# Patient Record
Sex: FEMALE | Race: WHITE
[De-identification: names, ages, dates, MRNs, and addresses within clinical notes are randomized per-mention and may not be internally consistent; named-entity substitution may affect disease eponyms.]

---

## 2019-09-24 ENCOUNTER — HOSPITAL ENCOUNTER (EMERGENCY)
Dept: HOSPITAL 12 - ER | Age: 69
LOS: 1 days | Discharge: ANOTHER HOSPITAL | End: 2019-09-25
Payer: COMMERCIAL

## 2019-09-24 VITALS — BODY MASS INDEX: 22.2 KG/M2 | HEIGHT: 64 IN | WEIGHT: 130 LBS

## 2019-09-24 DIAGNOSIS — E03.9: ICD-10-CM

## 2019-09-24 DIAGNOSIS — M25.561: Primary | ICD-10-CM

## 2019-09-24 DIAGNOSIS — E78.5: ICD-10-CM

## 2019-09-24 DIAGNOSIS — F32.9: ICD-10-CM

## 2019-09-24 LAB
ALP SERPL-CCNC: 76 U/L (ref 50–136)
ALT SERPL W/O P-5'-P-CCNC: 15 U/L (ref 14–59)
AST SERPL-CCNC: 17 U/L (ref 15–37)
BASOPHILS # BLD AUTO: 0 K/UL (ref 0–8)
BASOPHILS NFR BLD AUTO: 0.2 % (ref 0–2)
BILIRUB DIRECT SERPL-MCNC: 0.1 MG/DL (ref 0–0.2)
BILIRUB SERPL-MCNC: 0.2 MG/DL (ref 0.2–1)
BUN SERPL-MCNC: 16 MG/DL (ref 7–18)
CHLORIDE SERPL-SCNC: 108 MMOL/L (ref 98–107)
CO2 SERPL-SCNC: 29 MMOL/L (ref 21–32)
CREAT SERPL-MCNC: 0.8 MG/DL (ref 0.6–1.3)
EOSINOPHIL # BLD AUTO: 0.1 K/UL (ref 0–0.7)
EOSINOPHIL NFR BLD AUTO: 1.1 % (ref 0–7)
GLUCOSE SERPL-MCNC: 96 MG/DL (ref 74–106)
HCT VFR BLD AUTO: 35.7 % (ref 31.2–41.9)
HGB BLD-MCNC: 11.9 G/DL (ref 10.9–14.3)
LYMPHOCYTES # BLD AUTO: 1.8 K/UL (ref 20–40)
LYMPHOCYTES NFR BLD AUTO: 30.7 % (ref 20.5–51.5)
MCH RBC QN AUTO: 30.6 UUG (ref 24.7–32.8)
MCHC RBC AUTO-ENTMCNC: 33 G/DL (ref 32.3–35.6)
MCV RBC AUTO: 92.3 FL (ref 75.5–95.3)
MONOCYTES # BLD AUTO: 0.5 K/UL (ref 2–10)
MONOCYTES NFR BLD AUTO: 9 % (ref 0–11)
NEUTROPHILS # BLD AUTO: 3.5 K/UL (ref 1.8–8.9)
NEUTROPHILS NFR BLD AUTO: 59 % (ref 38.5–71.5)
PLATELET # BLD AUTO: 249 K/UL (ref 179–408)
POTASSIUM SERPL-SCNC: 4.5 MMOL/L (ref 3.5–5.1)
RBC # BLD AUTO: 3.87 MIL/UL (ref 3.63–4.92)
WBC # BLD AUTO: 6 K/UL (ref 3.8–11.8)
WS STN SPEC: 6.9 G/DL (ref 6.4–8.2)

## 2019-09-24 PROCEDURE — 96372 THER/PROPH/DIAG INJ SC/IM: CPT

## 2019-09-24 PROCEDURE — A4663 DIALYSIS BLOOD PRESSURE CUFF: HCPCS

## 2019-09-24 PROCEDURE — 80048 BASIC METABOLIC PNL TOTAL CA: CPT

## 2019-09-24 PROCEDURE — 93971 EXTREMITY STUDY: CPT

## 2019-09-24 PROCEDURE — 85025 COMPLETE CBC W/AUTO DIFF WBC: CPT

## 2019-09-24 PROCEDURE — 85730 THROMBOPLASTIN TIME PARTIAL: CPT

## 2019-09-24 PROCEDURE — 73562 X-RAY EXAM OF KNEE 3: CPT

## 2019-09-24 PROCEDURE — 36415 COLL VENOUS BLD VENIPUNCTURE: CPT

## 2019-09-24 PROCEDURE — 99285 EMERGENCY DEPT VISIT HI MDM: CPT

## 2019-09-24 PROCEDURE — 80076 HEPATIC FUNCTION PANEL: CPT

## 2019-09-24 NOTE — NUR
[facility] called Back Patient will be going to [facility] ER. Admitting MD is Dr Mota.

## 2019-09-25 ENCOUNTER — HOSPITAL ENCOUNTER (OUTPATIENT)
Dept: HOSPITAL 91 - E/R | Age: 69
Setting detail: OBSERVATION
LOS: 2 days | Discharge: HOME HEALTH | End: 2019-09-27
Payer: COMMERCIAL

## 2019-09-25 ENCOUNTER — HOSPITAL ENCOUNTER (OUTPATIENT)
Dept: HOSPITAL 10 - E/R | Age: 69
Setting detail: OBSERVATION
LOS: 2 days | Discharge: HOME HEALTH | End: 2019-09-27
Admitting: INTERNAL MEDICINE
Payer: COMMERCIAL

## 2019-09-25 VITALS — SYSTOLIC BLOOD PRESSURE: 107 MMHG | RESPIRATION RATE: 18 BRPM | HEART RATE: 56 BPM | DIASTOLIC BLOOD PRESSURE: 51 MMHG

## 2019-09-25 VITALS
BODY MASS INDEX: 20.75 KG/M2 | WEIGHT: 124.56 LBS | BODY MASS INDEX: 20.75 KG/M2 | HEIGHT: 65 IN | HEIGHT: 65 IN | WEIGHT: 124.56 LBS

## 2019-09-25 VITALS — DIASTOLIC BLOOD PRESSURE: 55 MMHG | RESPIRATION RATE: 18 BRPM | HEART RATE: 69 BPM | SYSTOLIC BLOOD PRESSURE: 101 MMHG

## 2019-09-25 VITALS — SYSTOLIC BLOOD PRESSURE: 131 MMHG | DIASTOLIC BLOOD PRESSURE: 60 MMHG | RESPIRATION RATE: 18 BRPM | HEART RATE: 62 BPM

## 2019-09-25 VITALS — HEART RATE: 76 BPM | SYSTOLIC BLOOD PRESSURE: 106 MMHG | DIASTOLIC BLOOD PRESSURE: 54 MMHG | RESPIRATION RATE: 18 BRPM

## 2019-09-25 DIAGNOSIS — M17.11: Primary | ICD-10-CM

## 2019-09-25 DIAGNOSIS — E03.9: ICD-10-CM

## 2019-09-25 DIAGNOSIS — F41.9: ICD-10-CM

## 2019-09-25 DIAGNOSIS — E78.5: ICD-10-CM

## 2019-09-25 PROCEDURE — 97164 PT RE-EVAL EST PLAN CARE: CPT

## 2019-09-25 PROCEDURE — 80053 COMPREHEN METABOLIC PANEL: CPT

## 2019-09-25 PROCEDURE — 84443 ASSAY THYROID STIM HORMONE: CPT

## 2019-09-25 PROCEDURE — 97110 THERAPEUTIC EXERCISES: CPT

## 2019-09-25 PROCEDURE — 99285 EMERGENCY DEPT VISIT HI MDM: CPT

## 2019-09-25 PROCEDURE — 97161 PT EVAL LOW COMPLEX 20 MIN: CPT

## 2019-09-25 PROCEDURE — 97116 GAIT TRAINING THERAPY: CPT

## 2019-09-25 PROCEDURE — 96372 THER/PROPH/DIAG INJ SC/IM: CPT

## 2019-09-25 PROCEDURE — 84100 ASSAY OF PHOSPHORUS: CPT

## 2019-09-25 PROCEDURE — 85025 COMPLETE CBC W/AUTO DIFF WBC: CPT

## 2019-09-25 PROCEDURE — 73721 MRI JNT OF LWR EXTRE W/O DYE: CPT

## 2019-09-25 PROCEDURE — 83735 ASSAY OF MAGNESIUM: CPT

## 2019-09-25 PROCEDURE — 80061 LIPID PANEL: CPT

## 2019-09-25 PROCEDURE — 83036 HEMOGLOBIN GLYCOSYLATED A1C: CPT

## 2019-09-25 PROCEDURE — 96375 TX/PRO/DX INJ NEW DRUG ADDON: CPT

## 2019-09-25 PROCEDURE — G0378 HOSPITAL OBSERVATION PER HR: HCPCS

## 2019-09-25 RX ADMIN — ENOXAPARIN SODIUM SCH MG: 100 INJECTION SUBCUTANEOUS at 09:05

## 2019-09-25 RX ADMIN — IBUPROFEN 1 MG: 400 TABLET, FILM COATED ORAL at 14:16

## 2019-09-25 RX ADMIN — IBUPROFEN SCH MG: 400 TABLET ORAL at 18:42

## 2019-09-25 RX ADMIN — IBUPROFEN 1 MG: 400 TABLET, FILM COATED ORAL at 23:59

## 2019-09-25 RX ADMIN — ACETAMINOPHEN 1 MG: 325 TABLET, FILM COATED ORAL at 21:41

## 2019-09-25 RX ADMIN — ATORVASTATIN CALCIUM 1 MG: 10 TABLET, FILM COATED ORAL at 21:41

## 2019-09-25 RX ADMIN — IBUPROFEN SCH MG: 400 TABLET ORAL at 23:59

## 2019-09-25 RX ADMIN — CITALOPRAM HYDROBROMIDE 1 MG: 20 TABLET ORAL at 21:42

## 2019-09-25 RX ADMIN — CITALOPRAM HYDROBROMIDE SCH MG: 20 TABLET ORAL at 21:42

## 2019-09-25 RX ADMIN — IBUPROFEN 1 MG: 400 TABLET, FILM COATED ORAL at 18:42

## 2019-09-25 RX ADMIN — ATORVASTATIN CALCIUM SCH MG: 10 TABLET, FILM COATED ORAL at 21:41

## 2019-09-25 RX ADMIN — IBUPROFEN SCH MG: 400 TABLET ORAL at 09:04

## 2019-09-25 RX ADMIN — DEXAMETHASONE SODIUM PHOSPHATE 1 MG: 4 INJECTION, SOLUTION INTRAMUSCULAR; INTRAVENOUS at 06:09

## 2019-09-25 RX ADMIN — ENOXAPARIN SODIUM 1 MG: 100 INJECTION SUBCUTANEOUS at 09:05

## 2019-09-25 RX ADMIN — IBUPROFEN SCH MG: 400 TABLET ORAL at 14:16

## 2019-09-25 RX ADMIN — IBUPROFEN 1 MG: 400 TABLET, FILM COATED ORAL at 09:04

## 2019-09-25 NOTE — NUR
patient picked up from [facility] to be transferred to [facility] . 
patient ins table condition,. all belongings taken with the patient. all 
medical records given to EMT.

## 2019-09-25 NOTE — NUR
Called receiving facility to give report/ Nurse stated he had already received 
report from case management.

## 2019-09-26 VITALS — SYSTOLIC BLOOD PRESSURE: 105 MMHG | RESPIRATION RATE: 18 BRPM | DIASTOLIC BLOOD PRESSURE: 59 MMHG | HEART RATE: 67 BPM

## 2019-09-26 VITALS — DIASTOLIC BLOOD PRESSURE: 55 MMHG | SYSTOLIC BLOOD PRESSURE: 113 MMHG | HEART RATE: 77 BPM | RESPIRATION RATE: 18 BRPM

## 2019-09-26 VITALS — DIASTOLIC BLOOD PRESSURE: 29 MMHG | RESPIRATION RATE: 18 BRPM | HEART RATE: 58 BPM | SYSTOLIC BLOOD PRESSURE: 112 MMHG

## 2019-09-26 VITALS — HEART RATE: 80 BPM | SYSTOLIC BLOOD PRESSURE: 120 MMHG | RESPIRATION RATE: 18 BRPM | DIASTOLIC BLOOD PRESSURE: 60 MMHG

## 2019-09-26 RX ADMIN — ENOXAPARIN SODIUM SCH MG: 100 INJECTION SUBCUTANEOUS at 08:31

## 2019-09-26 RX ADMIN — ENOXAPARIN SODIUM 1 MG: 100 INJECTION SUBCUTANEOUS at 08:31

## 2019-09-26 RX ADMIN — CITALOPRAM HYDROBROMIDE 1 MG: 20 TABLET ORAL at 21:04

## 2019-09-26 RX ADMIN — ATORVASTATIN CALCIUM 1 MG: 10 TABLET, FILM COATED ORAL at 21:03

## 2019-09-26 RX ADMIN — LEVOTHYROXINE SODIUM 1 MCG: 25 TABLET ORAL at 06:34

## 2019-09-26 RX ADMIN — ACETAMINOPHEN 1 MG: 325 TABLET, FILM COATED ORAL at 08:27

## 2019-09-26 RX ADMIN — LEVOTHYROXINE SODIUM SCH MCG: 25 TABLET ORAL at 06:34

## 2019-09-26 RX ADMIN — ZOLPIDEM TARTRATE 1 MG: 5 TABLET, FILM COATED ORAL at 23:37

## 2019-09-26 RX ADMIN — CITALOPRAM HYDROBROMIDE SCH MG: 20 TABLET ORAL at 21:04

## 2019-09-26 RX ADMIN — ATORVASTATIN CALCIUM SCH MG: 10 TABLET, FILM COATED ORAL at 21:03

## 2019-09-27 VITALS — DIASTOLIC BLOOD PRESSURE: 55 MMHG | RESPIRATION RATE: 18 BRPM | HEART RATE: 77 BPM | SYSTOLIC BLOOD PRESSURE: 100 MMHG

## 2019-09-27 RX ADMIN — ENOXAPARIN SODIUM SCH MG: 100 INJECTION SUBCUTANEOUS at 09:50

## 2019-09-27 RX ADMIN — ACETAMINOPHEN 1 MG: 325 TABLET, FILM COATED ORAL at 12:54

## 2019-09-27 RX ADMIN — LEVOTHYROXINE SODIUM SCH MCG: 25 TABLET ORAL at 06:54

## 2019-09-27 RX ADMIN — LEVOTHYROXINE SODIUM 1 MCG: 25 TABLET ORAL at 06:54

## 2019-09-27 RX ADMIN — BETAMETHASONE SODIUM PHOSPHATE AND BETAMETHASONE ACETATE 1 MG: 3; 3 INJECTION, SUSPENSION INTRA-ARTICULAR; INTRALESIONAL; INTRAMUSCULAR at 11:06

## 2019-09-27 RX ADMIN — ENOXAPARIN SODIUM 1 MG: 100 INJECTION SUBCUTANEOUS at 09:50

## 2019-09-27 RX ADMIN — ACETAMINOPHEN 1 MG: 325 TABLET, FILM COATED ORAL at 06:54

## 2019-09-27 RX ADMIN — BUPIVACAINE HYDROCHLORIDE 1 ML: 2.5 INJECTION, SOLUTION EPIDURAL; INFILTRATION; INTRACAUDAL; PERINEURAL at 11:06
